# Patient Record
Sex: FEMALE
[De-identification: names, ages, dates, MRNs, and addresses within clinical notes are randomized per-mention and may not be internally consistent; named-entity substitution may affect disease eponyms.]

---

## 2017-03-18 ENCOUNTER — HOSPITAL ENCOUNTER (EMERGENCY)
Dept: HOSPITAL 25 - UCCORT | Age: 42
Discharge: HOME | End: 2017-03-18
Payer: MEDICARE

## 2017-03-18 VITALS — SYSTOLIC BLOOD PRESSURE: 110 MMHG | DIASTOLIC BLOOD PRESSURE: 78 MMHG

## 2017-03-18 DIAGNOSIS — J03.90: ICD-10-CM

## 2017-03-18 DIAGNOSIS — F17.210: ICD-10-CM

## 2017-03-18 DIAGNOSIS — J32.9: Primary | ICD-10-CM

## 2017-03-18 DIAGNOSIS — Z71.6: ICD-10-CM

## 2017-03-18 DIAGNOSIS — Z88.1: ICD-10-CM

## 2017-03-18 DIAGNOSIS — Z88.2: ICD-10-CM

## 2017-03-18 PROCEDURE — 99212 OFFICE O/P EST SF 10 MIN: CPT

## 2017-03-18 PROCEDURE — G0463 HOSPITAL OUTPT CLINIC VISIT: HCPCS

## 2017-03-18 NOTE — UC
Throat Pain/Nasal Blayne HPI





- HPI Summary


HPI Summary: 





ONE WEEK OF NONPRODUCTIVE COUGH,  SINUS CONGESTION, SORE THROAT. NO FEVER. 





- History of Current Complaint


Chief Complaint: UCRespiratory


Stated Complaint: EYE/HEADACHE/CONGESTION


Time Seen by Provider: 03/18/17 15:11


Hx Obtained From: Patient


Hx Last Menstrual Period: 3/9/17


Onset/Duration: Gradual Onset, Lasting Weeks, Worse Since - DAILY


Severity: Moderate


Cough: Nonproductive


Associated Signs & Symptoms: Positive: Hoarseness, Sinus Discomfort, Nasal 

Discharge





- Epiglottits Risk Factors


Epiglottis Risk Factors: Negative





- Allergies/Home Medications


Allergies/Adverse Reactions: 


 Allergies











Allergy/AdvReac Type Severity Reaction Status Date / Time


 


Cefaclor [From [facility]] Allergy Intermediate Hives Verified 03/18/17 14:25


 


Sulfa Drugs Allergy Intermediate Hives Verified 03/18/17 14:25














PMH/Surg Hx/FS Hx/Imm Hx


Previously Healthy: Yes


Endocrine History Of: Reports: Thyroid Disease





- Surgical History


Surgical History: Yes


Surgery Procedure, Year, and Place: bladder sling procedure.  spinal cord 

stimulator insertion





- Family History


Known Family History: Positive: None





- Social History


Occupation: Employed Full-time


Lives: With Family


Alcohol Use: None


Substance Use Type: None


Smoking Status (MU): Heavy Every Day Tobacco Smoker


Type: Cigarettes


Amount Used/How Often: 3-4 cigarettes daily


When Did the Patient Quit Smoking/Using Tobacco: 4 years ago


Cessation Counseling: Counseled 3+Min - 10 Min





- Immunization History


Most Recent Influenza Vaccination: none





Review of Systems


Constitutional: Negative


Skin: Negative


Eyes: Drainage


ENT: Ear Ache, Nasal Discharge


Respiratory: Cough


Cardiovascular: Negative


Gastrointestinal: Negative


Genitourinary: Negative


Motor: Negative


Neurovascular: Negative


Musculoskeletal: Negative


Neurological: Negative


Psychological: Negative


All Other Systems Reviewed And Are Negative: Yes





Physical Exam


Triage Information Reviewed: Yes


Appearance: No Pain Distress, Well-Nourished, Ill-Appearing - MILD


Vital Signs: 


 Initial Vital Signs











Temp  98.2 F   03/18/17 14:20


 


Pulse  82   03/18/17 14:20


 


Resp  16   03/18/17 14:20


 


BP  110/78   03/18/17 14:20


 


Pulse Ox  100   03/18/17 14:20











Vital Signs Reviewed: Yes


Eyes: Positive: Discharge - RIGHT EYE


ENT: Positive: Hearing grossly normal, Pharyngeal erythema, TM bulging, TM dull

, Tonsillar swelling


Dental Exam: Normal


Neck: Positive: Supple, Nontender, Enlarged Nodes @ - BILAT ANTERIOR CERVICAL LN


Respiratory Exam: Normal


Respiratory: Positive: Chest non-tender, Lungs clear, Normal breath sounds, No 

respiratory distress, No accessory muscle use


Cardiovascular Exam: Normal


Cardiovascular: Positive: RRR, No Murmur, Pulses Normal


Abdominal Exam: Normal


Abdomen Description: Positive: Nontender, No Organomegaly


Musculoskeletal Exam: Normal


Musculoskeletal: Positive: Strength Intact


Neurological Exam: Normal


Psychological Exam: Normal


Psychological: Positive: Normal Response To Family


Skin Exam: Normal





Throat Pain/Nasal Course/Dx





- Differential Dx/Diagnosis


Differential Diagnosis/HQI/PQRI: Influenza, Laryngitis, Sinusitis, Tonsillitis, 

URI


Provider Diagnoses: SINUSITIS.  TONSILLITIS





Discharge





- Discharge Plan


Condition: Stable


Disposition: HOME


Prescriptions: 


Amoxicillin/Clavulanate TAB* [Augmentin *] 875 mg PO BID #20 tab


Patient Education Materials:  Sinusitis (ED)


Referrals: 


Campbell Paula MD [Primary Care Provider] -

## 2017-10-11 ENCOUNTER — HOSPITAL ENCOUNTER (EMERGENCY)
Dept: HOSPITAL 25 - UCCORT | Age: 42
Discharge: ANOTHER HOSPITAL | End: 2017-10-11
Payer: MEDICARE

## 2017-10-11 VITALS — SYSTOLIC BLOOD PRESSURE: 111 MMHG | DIASTOLIC BLOOD PRESSURE: 72 MMHG

## 2017-10-11 DIAGNOSIS — M06.9: ICD-10-CM

## 2017-10-11 DIAGNOSIS — R07.9: Primary | ICD-10-CM

## 2017-10-11 DIAGNOSIS — M32.9: ICD-10-CM

## 2017-10-11 PROCEDURE — G0463 HOSPITAL OUTPT CLINIC VISIT: HCPCS

## 2017-10-11 PROCEDURE — 71020: CPT

## 2017-10-11 PROCEDURE — 99212 OFFICE O/P EST SF 10 MIN: CPT

## 2017-10-11 PROCEDURE — 93005 ELECTROCARDIOGRAM TRACING: CPT

## 2017-10-11 NOTE — UC
Cardiac HPI





- HPI Summary


HPI Summary: 





41 female presents to [facility] with complaints of mid sternal and left sided chest 

pain that began a few weeks ago. Patient denies SOB, cough, fever/chills, 

congestion or any other symptoms at this time. States she gets infusions for 

lupus which her last was on 10/4 which last time caused her to get 

pericarditis. She also has had pneumonia in the past. States she feels fine 

other than the dull aching chest pain that is worse with inspiration. No other 

complaints. PMHX significant for lupus, gastroparesis, and RA. Denies food/

position worsening her chest pain. No recent trauma/injury. No recent travel or 

prolonged bed rest. Does take estridol and use cigarettes. No radiation besides 

one time, when it first started it was sharp shooting and went into her left 

shoulder blade.





- History of Current Complaint


Chief Complaint: UCGeneralIllness


Stated Complaint: CONGESTION


Time Seen by Provider: 10/11/17 16:40


Hx Obtained From: Patient


Hx Last Menstrual Period: 9/25/17


Onset/Duration: Sudden Onset, Lasting Weeks, Still Present


Timing: Constant


Initial Severity: Mild


Current Severity: Mild


Chest Pain Location: Mid Sternal, Left Anterior


Character: Dull/Aching, Sharp/Stabbing - with inspiration


Aggravating Factor(s): Deep Breaths


Alleviating Factor(s): Nothing


Associated Signs & Symptoms: Positive: Chest Pain.  Negative: Recent Stress, 

Numbness, Tingling, Weakness, Swelling, Nausea/Vomiting, Cough, Hemoptysis, 

Abdominal Pain, Calf Pain/Swelling





- Risk Factors


Pulmonary Embolism Risk Factors: Oral Contraceptives, Smoking





- Allergy/Home Medications


Allergies/Adverse Reactions: 


 Allergies











Allergy/AdvReac Type Severity Reaction Status Date / Time


 


Cefaclor [From [facility]] Allergy Intermediate Hives Verified 10/11/17 16:37


 


Sulfa Drugs Allergy Intermediate Hives Verified 10/11/17 16:37











Home Medications: 


 Home Medications





Abatacept* [Orencia*] 250 mg IV SEE INSTRUCTIONS 10/11/17 [History Confirmed 10/

11/17]











PMH/Surg Hx/FS Hx/Imm Hx





- Additional Past Medical History


Additional PMH: 





PMHx: lupus, RA and gastroparesis





- Surgical History


Surgical History: Yes


Surgery Procedure, Year, and Place: bladder sling procedure.  spinal cord 

stimulator insertion





- Family History


Known Family History: Positive: None





- Social History


Alcohol Use: None


Substance Use Type: None


Smoking Status (MU): Light Every Day Tobacco Smoker


Type: Cigarettes


Amount Used/How Often: 7 cigs daily


When Did the Patient Quit Smoking/Using Tobacco: 4 years ago





- Immunization History


Most Recent Influenza Vaccination: none


Vaccination Up to Date: Yes





Review of Systems


Constitutional: Negative


Respiratory: Negative


Cardiovascular: Chest Pain


Gastrointestinal: Negative


Musculoskeletal: Negative


All Other Systems Reviewed And Are Negative: Yes





Physical Exam


Triage Information Reviewed: Yes


Appearance: Well-Appearing, No Pain Distress, Well-Nourished


Vital Signs: 


 Initial Vital Signs











Temp  97.2 F   10/11/17 16:32


 


Pulse  81   10/11/17 16:32


 


Resp  17   10/11/17 16:32


 


BP  111/72   10/11/17 16:32


 


Pulse Ox  99   10/11/17 16:32











Vital Signs Reviewed: Yes


Eyes: Positive: Conjunctiva Clear


ENT: Positive: Normal ENT inspection, Hearing grossly normal, Pharynx normal, 

TMs normal


Neck: Positive: Supple, Nontender, No Lymphadenopathy


Respiratory: Positive: Chest non-tender, Lungs clear, Normal breath sounds, No 

respiratory distress, No accessory muscle use.  Negative: Respiratory distress, 

Rhonchi, Stridor, Wheezing


Cardiovascular: Positive: RRR, No Murmur, Pulses Normal, Brisk Capillary Refill

, Other: - chest pain is reproducible


Abdomen Description: Positive: Nontender, Soft


Bowel Sounds: Positive: Present


Musculoskeletal: Positive: Strength Intact, ROM Intact


Neurological: Positive: Alert


Skin Exam: Normal





Diagnostics





- Radiology


  ** chest


Xray Interpretation: No Acute Changes - no acute intrathoracic disease


Radiology Interpretation Completed By: Radiologist





- EKG


Cardiac Rate: NL


Cardiac Rhythm: Sinus: Normal


Ectopy: None


ST Segment: Normal





- Assessment/Plan


Course Of Treatment: EKG and chest xray obtained. EKG appeared normal. chest 

xray unremarkable. due to patient complaint of symptoms, history of pericarditis

/pneumonia, increased risk of DVT/PE and unable to perform complete work up to 

rule out other cardiorespiratory etiolgoy was referred to ER. (Aneurysm, PE, MI

) Does not however appear to be pneumonia or pericarditis at this time. Normal 

vitals and PE findings, did have reproducible chest pain, possibly suggesting 

costochondritis however no significant cause. Patient decided to sign out AMA. 

Recommended to try ibuprofen and to follow up with PCP. Was educated and is 

aware of risk and worsening signs /symptoms.





- Differential Diagnoses - Chest Pain


Differential Diagnosis/HQI/PQRI: Acute MI, Chest Wall, GI Disease, Lower 

Respiratory Infection, Pulmonary Edema, Pulmonary Embolism, Other: - 

pericarditis





- Differential Diagnoses - Hypertension


Differential Diagnosis/HQI PQRI: AAA





- Clinical Impression


Provider Diagnoses: chest pain





Discharge





- Discharge Plan


Condition: Stable


Disposition: ADMITTED TO [facility]


Referrals: 


Campbell Paula MD [Primary Care Provider] - 


Additional Instructions: 


Recommend going straight to ER for further work up and rule out other serious 

etiology.

## 2017-12-18 ENCOUNTER — HOSPITAL ENCOUNTER (EMERGENCY)
Dept: HOSPITAL 25 - UCCORT | Age: 42
Discharge: HOME | End: 2017-12-18
Payer: COMMERCIAL

## 2017-12-18 VITALS — SYSTOLIC BLOOD PRESSURE: 132 MMHG | DIASTOLIC BLOOD PRESSURE: 76 MMHG

## 2017-12-18 DIAGNOSIS — K31.84: ICD-10-CM

## 2017-12-18 DIAGNOSIS — J06.9: Primary | ICD-10-CM

## 2017-12-18 DIAGNOSIS — K58.9: ICD-10-CM

## 2017-12-18 PROCEDURE — G0463 HOSPITAL OUTPT CLINIC VISIT: HCPCS

## 2017-12-18 PROCEDURE — 87651 STREP A DNA AMP PROBE: CPT

## 2017-12-18 PROCEDURE — 99211 OFF/OP EST MAY X REQ PHY/QHP: CPT

## 2017-12-18 NOTE — UC
Throat Pain/Nasal Blayne HPI





- HPI Summary


HPI Summary: 





43 y/o female presents to the urgent care c/o HA, sore throat, B/L ear pain, 

nasal congestion and swollen lymph nodes and night sweats for the past week. Pt 

reports symptoms started with common cold, bur are getting worse. Pt states she 

has Hx of RA and Lupus. She had an infusion last week and that is when symptoms 

started. ore throat is 7/10 with swallowing. She started to take Tylenol Cold 

and flu yesterday. Pt hebert SOB, chest pain, abdominal pain, N/V/D








- History of Current Complaint


Chief Complaint: UCRespiratory


Time Seen by Provider: 12/18/17 11:00


Hx Obtained From: Patient


Hx Last Menstrual Period: "about two, three weeks ago"


Onset/Duration: Gradual Onset, Lasting Weeks - 1 week, Still Present, Worse 

Since - yesterday


Severity: Moderate


Pain Intensity: 7 - sore throat


Pain Scale Used: 0-10 Numeric


Cough: Nonproductive


Associated Signs & Symptoms: Positive: Dysphagia, Sinus Discomfort, Nasal 

Discharge, Fever





- Epiglottits Risk Factors


Epiglottis Risk Factors: Negative





- Allergies/Home Medications


Allergies/Adverse Reactions: 


 Allergies











Allergy/AdvReac Type Severity Reaction Status Date / Time


 


Cefaclor [From [facility]] Allergy Intermediate Hives Verified 12/18/17 10:47


 


Sulfa Drugs Allergy Intermediate Hives Verified 12/18/17 10:47


 


Adhesive Tape Allergy  Rash Verified 12/18/17 10:47











Home Medications: 


 Home Medications





Dexamethasone IV* [Decadron IV*] 0 mg IV MONTHLY 12/18/17 [History Confirmed 12/ 18/17]


Ibuprofen TAB* [Motrin TAB* 800 MG] 800 mg PO Q8H PRN 12/18/17 [History 

Confirmed 12/18/17]


Phenylephrine-Dm-GG W/ APAP [Tylenol Cold & Flu Severe 5--325 mg] 2 tab 

PO Q6H PRN 12/18/17 [History Confirmed 12/18/17]


Vitamin THERAPEUTIC TAB* [Theragran TAB*] 1 tab PO DAILY 12/18/17 [History 

Confirmed 12/18/17]











PMH/Surg Hx/FS Hx/Imm Hx


Previously Healthy: Yes


Other Endocrine History: RA, Lupus


Other GI/ History: IBS, gastroperesis


Other Neurological History: DDD





- Surgical History


Surgical History: Yes


Surgery Procedure, Year, and Place: bladder sling procedure.  spinal cord 

stimulator insertion





- Family History


Known Family History: Positive: None





- Social History


Occupation: Employed Full-time


Lives: With Family


Alcohol Use: None


Substance Use Type: None


Smoking Status (MU): Light Every Day Tobacco Smoker


Type: Cigarettes


Amount Used/How Often: 1/4 PPD


Length of Time of Smoking/Using Tobacco: Since Age 17 (Quit for 4 Years)


When Did the Patient Quit Smoking/Using Tobacco: 4 years ago





- Immunization History


Most Recent Influenza Vaccination: Not the 2017/2018 Season


Vaccination Up to Date: Yes





Review of Systems


Constitutional: Fever - subjective at home


Skin: Negative


Eyes: Negative


ENT: Sore Throat, Nasal Discharge, Sinus Congestion, Sinus Pain/Tenderness


Respiratory: Cough - dry


Cardiovascular: Negative


Gastrointestinal: Negative


Genitourinary: Negative


Motor: Negative


Neurovascular: Negative


Musculoskeletal: Negative


Neurological: Headache


Psychological: Negative


Is Patient Immunocompromised?: No


All Other Systems Reviewed And Are Negative: Yes





Physical Exam


Triage Information Reviewed: Yes


Vital Signs: 


 Initial Vital Signs











Temp  98.7 F   12/18/17 10:45


 


Pulse  76   12/18/17 10:45


 


Resp  16   12/18/17 10:45


 


BP  132/76   12/18/17 10:45


 


Pulse Ox  97   12/18/17 10:45














- Additional Comments


VITAL SIGNS: Reviewed. 


GENERAL: Patient is a well developed and nourished female who is sitting 

comfortable in the examining table. Patient is not in any acute respiratory 

distress. 


HEAD AND FACE: No signs of trauma. No ecchymosis, hematomas or skull 

depressions. No sinus tenderness. 


EYES: PERRLA, EOMI x 2, No injected conjunctiva, no nystagmus. No photophobia.


EARS: Hearing grossly intact. Ear canals and tympanic membranes are within 

normal limits. NOSE: erythematous,edematous nasal mucosa with yellowish nasal 

discharge


MOUTH: Positive pharynx with erythema, no exudates, mild palatal petechiae. B/L 

tonsillar enlargement with no exudate. Uvula in midline. 


NECK: Supple, trachea is midline, Positive anterior cervical lymphadenopathy, 

no JVD, no carotid bruit, no c-spine tenderness, neck with full ROM. No 

meningeal signs, no Kernig's or brudzinskis signs. 


CHEST: Symmetric, no tenderness at palpation 


LUNGS: Clear to auscultation bilaterally. No wheezing or crackles.


CVS: Regular rate and rhythm, S1 and S2 present, no murmurs or gallops 

appreciated. 


ABDOMEN: Soft, non-tender. No signs of distention. No rebound no guarding, and 

no masses palpated. Bowel sounds are normal. 


EXTREMITIES: FROM in all major joints, no edema, no cyanosis or clubbing.


NEURO: Alert and oriented x 3. No acute neurological deficits. Speech is normal 

and follows commands. 


SKIN: Dry and warm 











Throat Pain/Nasal Course/Dx





- Course


Course Of Treatment: 43 y/o female presents to the urgent care c/o HA, sore 

throat, B/L ear pain, nasal congestion and swollen lymph nodes and night sweats 

for the past week. Pt reports symptoms started with common cold, bur are 

getting worse. Pt states she has Hx of RA and Lupus. She had an infusion last 

week and that is when symptoms started. Sore throat is 7/10 with swallowing. 

She started to take Tylenol Cold and flu yesterday. Pt denies SOB, chest pain, 

abdominal pain, N/V/D. Hx obtained. Pt with upper respiratory infection on 

examination. Rapid strep ordered, result: negative.Pt advised to continue 

taking Tylenol PO to alleviates symptoms of pain and swelling. Advised on hand 

washing to avoid spreading. Pt advised to rest, eat well and avoid strenuous 

exercise. If symptoms do not improve or worsen advised to return to the urgent 

care or f/u with her PCP for further evaluation and treatment. Pt understood 

and agreed





- Differential Dx/Diagnosis


Differential Diagnosis/HQI/PQRI: Influenza, Laryngitis, Otitis Media, 

Peritonsillar Abscess, Pharyngitis, Sinusitis, Tonsillitis, URI


Provider Diagnoses: 1- upper respiratory infection





Discharge





- Discharge Plan


Condition: Stable


Disposition: HOME


Patient Education Materials:  Upper Respiratory Infection (ED)


Referrals: 


Campbell Paula MD [Primary Care Provider] - If Needed


Additional Instructions: 


1-Please continue taking Tylenol cold and flu  PO q6-8hrs prn as instructed 

after meals to alleviate pain and swelling. Increase fluid intake, eat well, 

rest and avoid strenuous exercise. 


2- Use the flonase as directed and apply OTC saline drops on each nostril to 

clear sinuses as instructed


2-If symptoms do not improve or worsen please return to the urgent care or f/u 

with your PCP for further evaluation and treatment.

## 2018-12-31 ENCOUNTER — HOSPITAL ENCOUNTER (EMERGENCY)
Dept: HOSPITAL 25 - UCCORT | Age: 43
Discharge: HOME | End: 2018-12-31
Payer: COMMERCIAL

## 2018-12-31 VITALS — DIASTOLIC BLOOD PRESSURE: 95 MMHG | SYSTOLIC BLOOD PRESSURE: 146 MMHG

## 2018-12-31 DIAGNOSIS — Z88.2: ICD-10-CM

## 2018-12-31 DIAGNOSIS — J98.01: ICD-10-CM

## 2018-12-31 DIAGNOSIS — M06.9: ICD-10-CM

## 2018-12-31 DIAGNOSIS — J06.9: Primary | ICD-10-CM

## 2018-12-31 DIAGNOSIS — Z88.1: ICD-10-CM

## 2018-12-31 DIAGNOSIS — F17.210: ICD-10-CM

## 2018-12-31 DIAGNOSIS — J32.9: ICD-10-CM

## 2018-12-31 PROCEDURE — 99212 OFFICE O/P EST SF 10 MIN: CPT

## 2018-12-31 PROCEDURE — G0463 HOSPITAL OUTPT CLINIC VISIT: HCPCS

## 2018-12-31 NOTE — UC
Respiratory Complaint HPI





- HPI Summary


HPI Summary: 


C/O congestion with sinus pain chills and aching. Mild non-productive cough. 

Had remicaide infusion 10 days ago.





- History of Current Complaint


Chief Complaint: UCGeneralIllness


Stated Complaint: SINUS PRESSURE, HEADACHE, FATIGUE


Time Seen by Provider: 12/31/18 15:13


Hx Obtained From: Patient


Hx Last Menstrual Period: "about two, three weeks ago"


Pregnant?: No


Onset/Duration: Sudden Onset, Lasting Days - 3, Worse Since - onset


Timing: Constant


Severity Initially: Mild


Severity Currently: Moderate


Pain Intensity: 0


Character: Cough: Nonproductive


Associated Signs And Symptoms: Positive: Fever, Chills, URI, Nasal Congestion, 

Sinus Discomfort





- Allergies/Home Medications


Allergies/Adverse Reactions: 


 Allergies











Allergy/AdvReac Type Severity Reaction Status Date / Time


 


Adhesive Tape Allergy  Rash Verified 12/18/17 10:47


 


cefaclor [From Ceclor] Allergy  Hives Verified 12/31/18 15:09


 


Sulfa (Sulfonamide Allergy  Hives Verified 12/31/18 15:09





Antibiotics)     











Home Medications: 


 Home Medications





inFLIXimab* [Remicade*] 100 mg IV MONTHLY 12/31/18 [History Confirmed 12/31/18]











PMH/Surg Hx/FS Hx/Imm Hx





- Additional Past Medical History


Additional PMH: 


Lupus and RA.





- Surgical History


Surgical History: Yes


Surgery Procedure, Year, and Place: bladder sling procedure.  spinal cord 

stimulator insertion.  HYSTERECTOMY





- Family History


Known Family History: Positive: Respiratory Disease - asthma


   Negative: Diabetes





- Social History


Occupation: Employed Full-time


Lives: With Family


Alcohol Use: None


Substance Use Type: None


Smoking Status (MU): Light Every Day Tobacco Smoker


Type: Cigarettes


Amount Used/How Often: 1/4 PPD


Length of Time of Smoking/Using Tobacco: Since Age 17 (Quit for 4 Years)


When Did the Patient Quit Smoking/Using Tobacco: 4 years ago





- Immunization History


Most Recent Influenza Vaccination: Not the 2017/2018 Season


Vaccination Up to Date: Yes





Review of Systems


All Other Systems Reviewed And Are Negative: Yes


Constitutional: Positive: Fever, Chills, Fatigue


ENT: Positive: Sore Throat, Sinus Congestion, Sinus Pain/Tenderness


Respiratory: Positive: Cough


Is Patient Immunocompromised?: Yes





Physical Exam


Triage Information Reviewed: Yes


Appearance: No Pain Distress, Ill-Appearing, Obese


Vital Signs: 


 Initial Vital Signs











Temp  98.8 F   12/31/18 15:07


 


Pulse  83   12/31/18 15:07


 


Resp  19   12/31/18 15:07


 


BP  146/95   12/31/18 15:07


 


Pulse Ox  99   12/31/18 15:07











Vital Signs Reviewed: Yes


Eyes: Positive: Conjunctiva Clear


ENT: Positive: Pharynx normal, Nasal congestion, TMs normal


Neck exam: Normal


Respiratory: Positive: Wheezing - expiratory wheeze with coughing


Cardiovascular Exam: Normal


Musculoskeletal Exam: Normal


Neurological Exam: Normal


Psychological Exam: Normal


Skin Exam: Normal





UC Diagnostic Evaluation





- Laboratory


O2 Sat by Pulse Oximetry: 99





Respiratory Course/Dx





- Differential Dx/Diagnosis


Differential Diagnosis/HQI/PQRI: Asthma, Lower Resp Infection, Sinusitis


Provider Diagnosis: 


 Upper respiratory infection, Sinusitis, Bronchospasm, acute








Discharge





- Sign-Out/Discharge


Documenting (check all that apply): Patient Departure


All imaging exams completed and their final reports reviewed: No Studies





- Discharge Plan


Condition: Stable


Disposition: HOME


Prescriptions: 


Amoxicillin PO (*) [Amoxicillin 875 MG (*)] 875 mg PO BID #20 tab


predniSONE TAB* [Deltasone 20 MG TAB*] 60 mg PO DAILY #18 tab


Patient Education Materials:  Upper Respiratory Infection (ED), Wheezing (ED), 

Prednisone (By mouth), Amoxicillin (By mouth)


Referrals: 


Campbell Paula MD [Primary Care Provider] - 


Additional Instructions: 


NASAL SPRAYS AND DROPS: Afrin in the PUMP/ MIST bottle (Get generic 12 hours 

nasal decongestant spray). Tilt your head down and look at the floor while 

doing a strong sniff with the spray.


     Decongestant nasal sprays and drops often give dramatic relief from 

congestion.  They are often recommended for patients with sinus infection to 

assist with sinus drainage.  Persons with high blood pressure should consult 

the doctor before using these nasal sprays.


     Afrin and Noah-Synephrine are common over-the-counter preparations. They 

should not be used for more than five days, as "rebound" congestion can occur -

- the congestion flares as the drug wears off.


     A way of dealing with this rebound congestion problem is to medicate only 

one nostril each time, allowing the other nostril to recover from the medicine'

s effects.  When you no longer need the drug during the day, spray only one 

nostril each night.  This helps you sleep well without severe rebound 

congestion.


     Call the doctor if you develop severe headache, palpitations, or chest 

pain.





- Billing Disposition and Condition


Condition: STABLE


Disposition: Home

## 2019-07-17 NOTE — RAD
INDICATION: Anterior LEFT chest pain. Nonproductive cough. History of rheumatoid

arthritis, lupus.



COMPARISON: October 24, 2012 CT abdomen.



TECHNIQUE:  Dual energy PA and routine lateral views of the chest were obtained.



REPORT:  Clear lungs and pleural spaces. Negative for pneumothorax. The heart, pulmonary

vasculature, and mediastinal contours are unremarkable. Dorsal column stimulator leads

noted extending as far cephalad as T7. Unremarkable osseous structures and soft tissue

contours.



IMPRESSION: No evidence for acute intrathoracic disease. no

## 2019-11-05 ENCOUNTER — HOSPITAL ENCOUNTER (EMERGENCY)
Dept: HOSPITAL 25 - UCCORT | Age: 44
Discharge: HOME | End: 2019-11-05
Payer: COMMERCIAL

## 2019-11-05 VITALS — DIASTOLIC BLOOD PRESSURE: 74 MMHG | SYSTOLIC BLOOD PRESSURE: 134 MMHG

## 2019-11-05 DIAGNOSIS — Z88.1: ICD-10-CM

## 2019-11-05 DIAGNOSIS — M32.9: ICD-10-CM

## 2019-11-05 DIAGNOSIS — Z91.048: ICD-10-CM

## 2019-11-05 DIAGNOSIS — L30.1: Primary | ICD-10-CM

## 2019-11-05 DIAGNOSIS — H92.01: ICD-10-CM

## 2019-11-05 DIAGNOSIS — J02.9: ICD-10-CM

## 2019-11-05 DIAGNOSIS — Z87.891: ICD-10-CM

## 2019-11-05 DIAGNOSIS — Z88.2: ICD-10-CM

## 2019-11-05 PROCEDURE — 87651 STREP A DNA AMP PROBE: CPT

## 2019-11-05 PROCEDURE — G0463 HOSPITAL OUTPT CLINIC VISIT: HCPCS

## 2019-11-05 PROCEDURE — 99212 OFFICE O/P EST SF 10 MIN: CPT

## 2019-11-05 NOTE — UC
Skin Complaint HPI





- HPI Summary


HPI Summary: 





43 yo female presents with 2 complaints.





1) She developed a sore throat and right ear pain today. 





2) Over the last 2-3 weeks she has been noticing an itchy rash on her hands and 

feet. She has lupus and asked her rheumatologist about it, but was told it was 

not related to her lupus or medications and to try OTC lotion. Pt has been 

applying lotion with little relief. 





Denies fever, chills, sinus symptoms, cough. She is eating, drinking, and 

tolerating po well. 





- History of Current Complaint


Time Seen by Provider: 11/05/19 17:47


Stated Complaint: SKIN COMPLAINT,ST,NAUSEA


Hx Obtained From: Patient


Hx Last Menstrual Period: "about two, three weeks ago"


Onset Severity: Mild


Current Severity: Mild


Pain Intensity: 4


Pain Scale Used: 0-10 Numeric





- Allergy/Home Medications


Allergies/Adverse Reactions: 


 Allergies











Allergy/AdvReac Type Severity Reaction Status Date / Time


 


Adhesive Tape Allergy  Rash Verified 12/18/17 10:47


 


cefaclor [From Ceclor] Allergy  Hives Verified 12/31/18 15:09


 


Sulfa (Sulfonamide Allergy  Hives Verified 12/31/18 15:09





Antibiotics)     














PMH/Surg Hx/FS Hx/Imm Hx





- Additional Past Medical History


Additional PMH: 





Lupus





- Surgical History


Surgical History: Yes


Surgery Procedure, Year, and Place: bladder sling procedure.  spinal cord 

stimulator insertion.  HYSTERECTOMY





- Family History


Known Family History: Positive: Respiratory Disease - asthma


   Negative: Diabetes





- Social History


Occupation: Employed Full-time


Lives: With Family


Alcohol Use: None


Substance Use Type: None


Smoking Status (MU): Light Every Day Tobacco Smoker


Type: Cigarettes


Amount Used/How Often: 1/4 PPD


Length of Time of Smoking/Using Tobacco: Since Age 17 (Quit for 4 Years)


When Did the Patient Quit Smoking/Using Tobacco: 4 years ago





- Immunization History


Most Recent Influenza Vaccination: Not the 2017/2018 Season


Vaccination Up to Date: Yes





Review of Systems


All Other Systems Reviewed And Are Negative: No


Constitutional: Positive: Negative


Skin: Positive: Rash


Eyes: Positive: Negative


ENT: Positive: Sore Throat


Respiratory: Positive: Negative


Cardiovascular: Positive: Negative


Gastrointestinal: Positive: Negative


Neurological: Positive: Negative


Psychological: Positive: Negative





Physical Exam





- Summary


Physical Exam Summary: 





 


GENERAL: NAD. WDWN. No pain distress.


SKIN: B/L palms with mildly erythematous flat rash with faint scale appearance. 

Similar appearing lesion on right medial foot. Itchy. No drainage or bleeding. 


HEENT:


            Head: AT/NC


            Eyes: Conjunctiva clear without inflammation or discharge.


            Ears: Hearing grossly normal. TMs intact, no bulging, erythema, or 

edema. 


            Nose: Nasal mucosa pink and moist. NTTP maxillary and frontal 

sinus. 


            Throat: Posterior oropharynx mild erythema and 3+ tonsillar 

enlargement. No exudates. Uvula midline. No hoarse voice or muffled voice.


NECK: Supple. Nontender. No lymphadenopathy. 


CHEST:  CTAB. No r/r/w. No accessory muscle use. Breathing comfortably and in 

no distress.


CV:  RRR.. Pulses intact. Cap refill <2seconds


NEURO: Alert. 


PSYCH: Age appropriate behavior.





Triage Information Reviewed: Yes


Vital Signs: 





Vital Signs:











Temp Pulse Resp BP Pulse Ox


 


 98.2 F   82   16   134/74   100 


 


 11/05/19 17:55  11/05/19 17:55  11/05/19 17:55  11/05/19 17:55  11/05/19 17:55








 Laboratory Tests











  11/05/19





  18:05


 


Group A Strep Rapid  Negative











Vital Signs Reviewed: Yes





Course/Dx





- Course


Course Of Treatment: 





POC strep negative. Suspect viral sore throat.





Regarding her rash - this appears most consistent with dyshidrotic eczema





- Diagnoses


Provider Diagnosis: 


 Dyshidrotic eczema, Sore throat








Discharge ED





- Sign-Out/Discharge


Documenting (check all that apply): Patient Departure


All imaging exams completed and their final reports reviewed: No Studies





- Discharge Plan


Condition: Stable


Disposition: HOME


Prescriptions: 


Triamcinolone 0.1% CREAM (NF) [Kenalog 0.1% Cream (NF)] 1 applic TOPICAL BID #1 

tube


Patient Education Materials:  Eczema (ED), Pharyngitis (ED)


Referrals: 


Campbell Paula MD [Primary Care Provider] - 


Additional Instructions: 


If you develop a fever, shortness of breath, chest pain, new or worsening 

symptoms - please call your PCP or go to the ED immediately.


 


Your strep test was negative today





- Billing Disposition and Condition


Condition: STABLE


Disposition: Home